# Patient Record
(demographics unavailable — no encounter records)

---

## 2025-05-23 NOTE — HEALTH RISK ASSESSMENT
[Very Good] : ~his/her~  mood as very good [Yes] : Yes [Any fall with injury in past year] : Patient reported fall with injury in the past year [0] : 2) Feeling down, depressed, or hopeless: Not at all (0) [PHQ-2 Negative - No further assessment needed] : PHQ-2 Negative - No further assessment needed [Never] : Never [] :  [de-identified] : to restart exercise [de-identified] : eats healthy [[id number]] : 0 [ColonoscopyComments] : never

## 2025-05-23 NOTE — ASSESSMENT
[Vaccines Reviewed] : Immunizations reviewed today. Please see immunization details in the vaccine log within the immunization flowsheet.  [FreeTextEntry1] :  Hearing loss - Will refer to ENT  Right shoulder dislocation - Still goes to physical therapy  hcm Colonoscopy! Shingles vaccine at pharmacy 10 year CV risk 7%

## 2025-05-23 NOTE — HISTORY OF PRESENT ILLNESS
[FreeTextEntry1] : Here for CPE [de-identified] : last November dislocated right shoulder from a fall - went to ER, positioned it back in ; still going to PT, has some limited ROM Keeps him from going to the gym Sees orthoDr. Wilcox  Complains of hearing loss